# Patient Record
Sex: MALE | Race: WHITE | Employment: OTHER | URBAN - METROPOLITAN AREA
[De-identification: names, ages, dates, MRNs, and addresses within clinical notes are randomized per-mention and may not be internally consistent; named-entity substitution may affect disease eponyms.]

---

## 2019-01-08 ENCOUNTER — TRANSFERRED RECORDS (OUTPATIENT)
Dept: HEALTH INFORMATION MANAGEMENT | Facility: CLINIC | Age: 79
End: 2019-01-08

## 2019-01-10 ENCOUNTER — TRANSFERRED RECORDS (OUTPATIENT)
Dept: HEALTH INFORMATION MANAGEMENT | Facility: CLINIC | Age: 79
End: 2019-01-10

## 2019-01-10 LAB
CREAT SERPL-MCNC: 1.7 MG/DL (ref 0.7–1.2)
GFR SERPL CREATININE-BSD FRML MDRD: 39 ML/MIN/1.73M2
GLUCOSE SERPL-MCNC: 87 MG/DL (ref 74–106)
POTASSIUM SERPL-SCNC: 5.4 MMOL/L (ref 3.5–5)

## 2019-01-24 ENCOUNTER — TRANSFERRED RECORDS (OUTPATIENT)
Dept: HEALTH INFORMATION MANAGEMENT | Facility: CLINIC | Age: 79
End: 2019-01-24

## 2019-01-24 ENCOUNTER — APPOINTMENT (OUTPATIENT)
Dept: CT IMAGING | Facility: CLINIC | Age: 79
End: 2019-01-24
Payer: COMMERCIAL

## 2019-01-24 ENCOUNTER — HOSPITAL ENCOUNTER (OUTPATIENT)
Facility: CLINIC | Age: 79
Setting detail: OBSERVATION
Discharge: HOME | End: 2019-01-25
Attending: EMERGENCY MEDICINE | Admitting: HOSPITALIST
Payer: COMMERCIAL

## 2019-01-24 DIAGNOSIS — N17.9 AKI (ACUTE KIDNEY INJURY) (H): ICD-10-CM

## 2019-01-24 DIAGNOSIS — R55 SYNCOPE, UNSPECIFIED SYNCOPE TYPE: ICD-10-CM

## 2019-01-24 LAB
ALBUMIN UR-MCNC: NEGATIVE MG/DL
ANION GAP SERPL CALCULATED.3IONS-SCNC: 9 MMOL/L (ref 3–14)
APPEARANCE UR: CLEAR
BASOPHILS # BLD AUTO: 0 10E9/L (ref 0–0.2)
BASOPHILS NFR BLD AUTO: 0.1 %
BILIRUB UR QL STRIP: NEGATIVE
BUN SERPL-MCNC: 40 MG/DL (ref 7–30)
CALCIUM SERPL-MCNC: 8.1 MG/DL (ref 8.5–10.1)
CHLORIDE SERPL-SCNC: 110 MMOL/L (ref 94–109)
CO2 SERPL-SCNC: 24 MMOL/L (ref 20–32)
COLOR UR AUTO: NORMAL
CREAT SERPL-MCNC: 1.81 MG/DL (ref 0.66–1.25)
DIFFERENTIAL METHOD BLD: ABNORMAL
EOSINOPHIL # BLD AUTO: 0.1 10E9/L (ref 0–0.7)
EOSINOPHIL NFR BLD AUTO: 1.2 %
ERYTHROCYTE [DISTWIDTH] IN BLOOD BY AUTOMATED COUNT: 14.1 % (ref 10–15)
GFR SERPL CREATININE-BSD FRML MDRD: 35 ML/MIN/{1.73_M2}
GLUCOSE SERPL-MCNC: 102 MG/DL (ref 70–99)
GLUCOSE UR STRIP-MCNC: NEGATIVE MG/DL
HCT VFR BLD AUTO: 31.2 % (ref 40–53)
HGB BLD-MCNC: 10.2 G/DL (ref 13.3–17.7)
HGB UR QL STRIP: NEGATIVE
IMM GRANULOCYTES # BLD: 0 10E9/L (ref 0–0.4)
IMM GRANULOCYTES NFR BLD: 0.4 %
INTERPRETATION ECG - MUSE: NORMAL
KETONES UR STRIP-MCNC: NEGATIVE MG/DL
LEUKOCYTE ESTERASE UR QL STRIP: NEGATIVE
LYMPHOCYTES # BLD AUTO: 1.1 10E9/L (ref 0.8–5.3)
LYMPHOCYTES NFR BLD AUTO: 16 %
MCH RBC QN AUTO: 30.4 PG (ref 26.5–33)
MCHC RBC AUTO-ENTMCNC: 32.7 G/DL (ref 31.5–36.5)
MCV RBC AUTO: 93 FL (ref 78–100)
MONOCYTES # BLD AUTO: 0.6 10E9/L (ref 0–1.3)
MONOCYTES NFR BLD AUTO: 8.2 %
NEUTROPHILS # BLD AUTO: 5 10E9/L (ref 1.6–8.3)
NEUTROPHILS NFR BLD AUTO: 74.1 %
NITRATE UR QL: NEGATIVE
NRBC # BLD AUTO: 0 10*3/UL
NRBC BLD AUTO-RTO: 0 /100
PH UR STRIP: 6 PH (ref 5–7)
PLATELET # BLD AUTO: 139 10E9/L (ref 150–450)
POTASSIUM SERPL-SCNC: 4.1 MMOL/L (ref 3.4–5.3)
RBC # BLD AUTO: 3.36 10E12/L (ref 4.4–5.9)
RBC #/AREA URNS AUTO: <1 /HPF (ref 0–2)
SODIUM SERPL-SCNC: 143 MMOL/L (ref 133–144)
SOURCE: NORMAL
SP GR UR STRIP: 1 (ref 1–1.03)
SQUAMOUS #/AREA URNS AUTO: <1 /HPF (ref 0–1)
TROPONIN I SERPL-MCNC: <0.015 UG/L (ref 0–0.04)
TSH SERPL DL<=0.005 MIU/L-ACNC: 1.39 MU/L (ref 0.4–4)
UROBILINOGEN UR STRIP-MCNC: NORMAL MG/DL (ref 0–2)
WBC # BLD AUTO: 6.8 10E9/L (ref 4–11)
WBC #/AREA URNS AUTO: <1 /HPF (ref 0–5)

## 2019-01-24 PROCEDURE — 99285 EMERGENCY DEPT VISIT HI MDM: CPT | Mod: 25

## 2019-01-24 PROCEDURE — 93005 ELECTROCARDIOGRAM TRACING: CPT

## 2019-01-24 PROCEDURE — 84484 ASSAY OF TROPONIN QUANT: CPT | Performed by: EMERGENCY MEDICINE

## 2019-01-24 PROCEDURE — 80048 BASIC METABOLIC PNL TOTAL CA: CPT | Performed by: EMERGENCY MEDICINE

## 2019-01-24 PROCEDURE — 96360 HYDRATION IV INFUSION INIT: CPT

## 2019-01-24 PROCEDURE — 25000128 H RX IP 250 OP 636: Performed by: EMERGENCY MEDICINE

## 2019-01-24 PROCEDURE — A9270 NON-COVERED ITEM OR SERVICE: HCPCS | Mod: GY | Performed by: HOSPITALIST

## 2019-01-24 PROCEDURE — 84484 ASSAY OF TROPONIN QUANT: CPT | Performed by: HOSPITALIST

## 2019-01-24 PROCEDURE — 25000132 ZZH RX MED GY IP 250 OP 250 PS 637: Mod: GY | Performed by: HOSPITALIST

## 2019-01-24 PROCEDURE — 25000128 H RX IP 250 OP 636: Performed by: HOSPITALIST

## 2019-01-24 PROCEDURE — 99220 ZZC INITIAL OBSERVATION CARE,LEVL III: CPT | Performed by: HOSPITALIST

## 2019-01-24 PROCEDURE — 85025 COMPLETE CBC W/AUTO DIFF WBC: CPT | Performed by: EMERGENCY MEDICINE

## 2019-01-24 PROCEDURE — 96361 HYDRATE IV INFUSION ADD-ON: CPT

## 2019-01-24 PROCEDURE — G0378 HOSPITAL OBSERVATION PER HR: HCPCS

## 2019-01-24 PROCEDURE — 70450 CT HEAD/BRAIN W/O DYE: CPT

## 2019-01-24 PROCEDURE — 36415 COLL VENOUS BLD VENIPUNCTURE: CPT | Performed by: HOSPITALIST

## 2019-01-24 PROCEDURE — 81001 URINALYSIS AUTO W/SCOPE: CPT | Performed by: EMERGENCY MEDICINE

## 2019-01-24 PROCEDURE — 84443 ASSAY THYROID STIM HORMONE: CPT | Performed by: EMERGENCY MEDICINE

## 2019-01-24 RX ORDER — POLYETHYLENE GLYCOL 3350 17 G/17G
17 POWDER, FOR SOLUTION ORAL 2 TIMES DAILY
Status: DISCONTINUED | OUTPATIENT
Start: 2019-01-24 | End: 2019-01-25 | Stop reason: HOSPADM

## 2019-01-24 RX ORDER — AMMONIUM LACTATE 12 G/100G
LOTION TOPICAL DAILY
COMMUNITY

## 2019-01-24 RX ORDER — VENLAFAXINE HYDROCHLORIDE 150 MG/1
187.5 CAPSULE, EXTENDED RELEASE ORAL DAILY
COMMUNITY

## 2019-01-24 RX ORDER — BISACODYL 5 MG/1
10 TABLET, DELAYED RELEASE ORAL DAILY PRN
COMMUNITY

## 2019-01-24 RX ORDER — POLYETHYLENE GLYCOL 3350 17 G/17G
1 POWDER, FOR SOLUTION ORAL 2 TIMES DAILY
COMMUNITY

## 2019-01-24 RX ORDER — CARBIDOPA, LEVODOPA AND ENTACAPONE 37.5; 200; 15 MG/1; MG/1; MG/1
1 TABLET, FILM COATED ORAL 3 TIMES DAILY
Status: DISCONTINUED | OUTPATIENT
Start: 2019-01-24 | End: 2019-01-24

## 2019-01-24 RX ORDER — FLUTICASONE PROPIONATE 50 MCG
1 SPRAY, SUSPENSION (ML) NASAL DAILY
COMMUNITY

## 2019-01-24 RX ORDER — ONDANSETRON 4 MG/1
4 TABLET, ORALLY DISINTEGRATING ORAL EVERY 6 HOURS PRN
Status: DISCONTINUED | OUTPATIENT
Start: 2019-01-24 | End: 2019-01-25 | Stop reason: HOSPADM

## 2019-01-24 RX ORDER — GABAPENTIN 300 MG/1
300 CAPSULE ORAL 3 TIMES DAILY
COMMUNITY

## 2019-01-24 RX ORDER — FLUDROCORTISONE ACETATE 0.1 MG/1
0.1 TABLET ORAL DAILY
Status: DISCONTINUED | OUTPATIENT
Start: 2019-01-25 | End: 2019-01-24

## 2019-01-24 RX ORDER — CARBIDOPA/LEVODOPA 25MG-250MG
1 TABLET ORAL 3 TIMES DAILY
Status: DISCONTINUED | OUTPATIENT
Start: 2019-01-24 | End: 2019-01-25 | Stop reason: HOSPADM

## 2019-01-24 RX ORDER — QUETIAPINE FUMARATE 50 MG/1
50 TABLET, FILM COATED ORAL AT BEDTIME
COMMUNITY

## 2019-01-24 RX ORDER — CARBIDOPA/LEVODOPA 25MG-250MG
1 TABLET ORAL 3 TIMES DAILY
COMMUNITY

## 2019-01-24 RX ORDER — SODIUM CHLORIDE 9 MG/ML
INJECTION, SOLUTION INTRAVENOUS CONTINUOUS
Status: DISCONTINUED | OUTPATIENT
Start: 2019-01-24 | End: 2019-01-25

## 2019-01-24 RX ORDER — GABAPENTIN 300 MG/1
300 CAPSULE ORAL 2 TIMES DAILY
Status: ON HOLD | COMMUNITY
End: 2019-01-24

## 2019-01-24 RX ORDER — CLONAZEPAM 0.5 MG/1
1 TABLET ORAL AT BEDTIME
Status: DISCONTINUED | OUTPATIENT
Start: 2019-01-24 | End: 2019-01-24

## 2019-01-24 RX ORDER — ONDANSETRON 2 MG/ML
4 INJECTION INTRAMUSCULAR; INTRAVENOUS EVERY 6 HOURS PRN
Status: DISCONTINUED | OUTPATIENT
Start: 2019-01-24 | End: 2019-01-25 | Stop reason: HOSPADM

## 2019-01-24 RX ORDER — AMOXICILLIN 250 MG
3 CAPSULE ORAL 2 TIMES DAILY PRN
COMMUNITY

## 2019-01-24 RX ORDER — SODIUM CHLORIDE 9 MG/ML
1000 INJECTION, SOLUTION INTRAVENOUS CONTINUOUS
Status: DISCONTINUED | OUTPATIENT
Start: 2019-01-24 | End: 2019-01-24

## 2019-01-24 RX ORDER — MIRTAZAPINE 15 MG/1
45 TABLET, FILM COATED ORAL AT BEDTIME
Status: DISCONTINUED | OUTPATIENT
Start: 2019-01-24 | End: 2019-01-25 | Stop reason: HOSPADM

## 2019-01-24 RX ORDER — MIRTAZAPINE 45 MG/1
45 TABLET, FILM COATED ORAL AT BEDTIME
COMMUNITY

## 2019-01-24 RX ORDER — ACETAMINOPHEN 650 MG/1
650 SUPPOSITORY RECTAL EVERY 4 HOURS PRN
Status: DISCONTINUED | OUTPATIENT
Start: 2019-01-24 | End: 2019-01-25 | Stop reason: HOSPADM

## 2019-01-24 RX ORDER — ACETAMINOPHEN 325 MG/1
650 TABLET ORAL EVERY 4 HOURS PRN
Status: DISCONTINUED | OUTPATIENT
Start: 2019-01-24 | End: 2019-01-25 | Stop reason: HOSPADM

## 2019-01-24 RX ORDER — CARBIDOPA AND LEVODOPA 25; 100 MG/1; MG/1
1 TABLET ORAL 3 TIMES DAILY
COMMUNITY

## 2019-01-24 RX ORDER — CARBIDOPA AND LEVODOPA 25; 100 MG/1; MG/1
2 TABLET ORAL 3 TIMES DAILY
Status: DISCONTINUED | OUTPATIENT
Start: 2019-01-24 | End: 2019-01-24

## 2019-01-24 RX ORDER — RASAGILINE 1 MG/1
1 TABLET ORAL EVERY MORNING
Status: DISCONTINUED | OUTPATIENT
Start: 2019-01-25 | End: 2019-01-24

## 2019-01-24 RX ORDER — NALOXONE HYDROCHLORIDE 0.4 MG/ML
.1-.4 INJECTION, SOLUTION INTRAMUSCULAR; INTRAVENOUS; SUBCUTANEOUS
Status: DISCONTINUED | OUTPATIENT
Start: 2019-01-24 | End: 2019-01-25 | Stop reason: HOSPADM

## 2019-01-24 RX ORDER — GABAPENTIN 300 MG/1
300 CAPSULE ORAL 3 TIMES DAILY
Status: DISCONTINUED | OUTPATIENT
Start: 2019-01-24 | End: 2019-01-25 | Stop reason: HOSPADM

## 2019-01-24 RX ORDER — NITROGLYCERIN 0.4 MG/1
0.4 TABLET SUBLINGUAL EVERY 5 MIN PRN
Status: DISCONTINUED | OUTPATIENT
Start: 2019-01-24 | End: 2019-01-25 | Stop reason: HOSPADM

## 2019-01-24 RX ORDER — QUETIAPINE FUMARATE 25 MG/1
50 TABLET, FILM COATED ORAL AT BEDTIME
Status: DISCONTINUED | OUTPATIENT
Start: 2019-01-24 | End: 2019-01-25 | Stop reason: HOSPADM

## 2019-01-24 RX ORDER — PANTOPRAZOLE SODIUM 40 MG/1
40 TABLET, DELAYED RELEASE ORAL DAILY
COMMUNITY

## 2019-01-24 RX ORDER — CARBIDOPA AND LEVODOPA 25; 100 MG/1; MG/1
1 TABLET ORAL 3 TIMES DAILY
Status: DISCONTINUED | OUTPATIENT
Start: 2019-01-24 | End: 2019-01-25 | Stop reason: HOSPADM

## 2019-01-24 RX ORDER — LIDOCAINE 40 MG/G
CREAM TOPICAL
Status: DISCONTINUED | OUTPATIENT
Start: 2019-01-24 | End: 2019-01-25 | Stop reason: HOSPADM

## 2019-01-24 RX ORDER — OXYCODONE AND ACETAMINOPHEN 5; 325 MG/1; MG/1
1 TABLET ORAL EVERY 6 HOURS PRN
Status: DISCONTINUED | OUTPATIENT
Start: 2019-01-24 | End: 2019-01-25 | Stop reason: HOSPADM

## 2019-01-24 RX ADMIN — SODIUM CHLORIDE 100 ML/HR: 9 INJECTION, SOLUTION INTRAVENOUS at 18:05

## 2019-01-24 RX ADMIN — QUETIAPINE 50 MG: 25 TABLET ORAL at 21:23

## 2019-01-24 RX ADMIN — CARBIDOPA AND LEVODOPA 1 TABLET: 25; 250 TABLET ORAL at 21:22

## 2019-01-24 RX ADMIN — SODIUM CHLORIDE 1000 ML: 9 INJECTION, SOLUTION INTRAVENOUS at 14:05

## 2019-01-24 RX ADMIN — MIRTAZAPINE 45 MG: 15 TABLET, FILM COATED ORAL at 21:22

## 2019-01-24 RX ADMIN — GABAPENTIN 300 MG: 300 CAPSULE ORAL at 20:59

## 2019-01-24 RX ADMIN — CARBIDOPA AND LEVODOPA 1 TABLET: 25; 100 TABLET ORAL at 19:25

## 2019-01-24 RX ADMIN — POLYETHYLENE GLYCOL 3350 17 G: 17 POWDER, FOR SOLUTION ORAL at 20:59

## 2019-01-24 ASSESSMENT — ENCOUNTER SYMPTOMS
DIARRHEA: 0
FEVER: 0
LIGHT-HEADEDNESS: 1
WEAKNESS: 1
SHORTNESS OF BREATH: 0
HEMATURIA: 0
NAUSEA: 0
NUMBNESS: 0
VOMITING: 0

## 2019-01-24 ASSESSMENT — MIFFLIN-ST. JEOR: SCORE: 1680.62

## 2019-01-24 NOTE — H&P
[facility]    History and Physical - Hospitalist Service       Date of Admission:  1/24/2019    Assessment & Plan   Sam Dye is a 78 year old male admitted on 1/24/2019.  Parkinson's disease with orthostatic hypotension, CAD status post CABG, chronic anemia, KATY, anxiety, ADHD who presents with syncope.    Syncope  Most likely secondary to chronic orthostatic hypotension, with possible component hypovolemia given elevated BUN and creatinine.  Available records from 2016 show baseline Cr 1.1-1.2 with admission creatinine 1.81.  Denies cardiorespiratory symptoms or infectious symptoms.  No reported sign of seizure activity or postictal state.   - infuse 1L NS over 10 hours   - given chronic orthostatic hypotension, would NOT repeatedly bolus if orthostatics remain positive  - telemetry, trend trop x2 but very low suspicion for ACS  - hgb down slightly from labs in 2016, will repeat labs in AM  - TSH  - CT head is pending given of SAH, though seems this was traumatic in nature  - [facility]    ROSE on CKD  Baseline creatinine as noted above.  Attempt to obtain outside records for more recent values.  Manage IV fluids as above and repeat BMP in the morning.    Parkinson's disease with orthostatic hypotension  Prior neurology and cardiology notes state orthostatic hypotension is suspected secondary to Parkinson's.  Has had negative EP study in 2008, negative loop recorder, negative stress tests in 2013.  Would not pursue further workup.  -Resume prior to admission Sinemet, Azilect, rotigotine, fludrocortisone once verified    Anxiety  -Continue prior to admission Klonopin, fluoxetine once verified    Hyperlipidemia  -Hold simvastatin as observation status    BPH  -Hold Flomax and trospium as observation status         Diet: regular  DVT Prophylaxis: Low Risk/no VTE prophylaxis indicated  Dumont Catheter: not present  Code Status: Full Code    Disposition Plan  "  Expected discharge: Tomorrow, recommended to prior living arrangement once ROSE resolved, no further syncope.  Entered: Octavio Bynum MD 01/24/2019, 3:53 PM     The patient's care was discussed with the Patient.    Octavio Bynum MD  [facility]    ______________________________________________________________________    Chief Complaint   Syncope    History is obtained from the patient, chart review and discussion with ED physician    History of Present Illness   Sam Dye is a 78 year old male who presents with syncope.  Patient was at his adult  center, transferring from his wheelchair to a recliner when he reports he became very weak and passed out.  He states he \"grayed out\" with gradual vision loss and was feeling lightheaded.  He has a long history of orthostatic hypotension, though reports his symptoms seem to be becoming more severe.  He denies any preceding chest pain or pressure, palpitations and denies any infectious symptoms including fevers or chills, shortness of breath, cough, headache, sore throat, nausea, diarrhea, rash or sores, urinary symptoms.  He denies any confusion following his syncopal episode.  His remainder of review of systems is otherwise negative.  Reports over the past 2 years he has [facility] both neurolog[facility] which is why we do not have records since 2016.  He is currently feeling back to baseline and asymptomatic.  He reports good oral intake over the past several days.    Review of Systems    The 10 point Review of Systems is negative other than noted in the HPI or here.     Past Medical History    Chronic kidney disease  Obstructive sleep apnea  Parkinson's disease with orthostatic hypotension  CAD status post CABG x1  Anxiety  ADHD  Basal cell carcinoma  Chronic anemia  History of traumatic subarachnoid hemorrhage  History of syncope    Past Surgical History   I have reviewed this patient's surgical " history and updated it with pertinent information if needed.  Past Surgical History:   Procedure Laterality Date     APPENDECTOMY       C LAMINECTOMY,>2 SGMT,CERVICAL       C NONSPECIFIC PROCEDURE      rotator cuff     C NONSPECIFIC PROCEDURE      scrotal fistula     C NONSPECIFIC PROCEDURE       CIRCUMCISION,CLAMP  age 22     COLONOSCOPY       CORONARY ARTERY BYPASS       HEMORRHOIDECTOMY       TONSILLECTOMY       VASECTOMY         Social History   He reports a distant tobacco history having quit in .  He has about 1 alcoholic beverage every other month.  He is largely wheelchair bound and resides with his wife at home.  He has home health coming in Monday, Wednesday and Friday.    Family History   Father with a CVA late in life.    Prior to Admission Medications   Prior to Admission Medications   Prescriptions Last Dose Informant Patient Reported? Taking?   ASPIRIN 81 MG OR TABS   Yes No   Si TABLET DAILY   Calcium Carbonate-Vitamin D (CALCIUM 600 + D OR)   Yes No   Sig: Take 1 tablet by mouth 2 times daily (with meals)   Cholecalciferol (VITAMIN D3) 2000 UNITS TABS   Yes No   Sig: Take 1 tablet by mouth daily   FISH OIL 1000 MG OR CAPS   Yes No   Sig: Take 2 capsules by mouth daily.    FLUoxetine (PROZAC) 20 MG capsule   No No   Sig: Take 1 capsule (20 mg) by mouth daily   MELATONIN PO   Yes No   Sig: Take by mouth daily At hs   Rotigotine 8 MG/24HR PT24   Yes No   Sig: Daily plus the 2mg patch for total daily dose of 10mg per day..    carbidopa-levodopa (SINEMET)  MG per tablet   Yes No   Sig: Take 2 tablets by mouth 3 times daily With Stalevo   carbidopa-levodopa-entacapone (STALEVO 150) 37.5-150-200 MG per tablet   Yes No   Sig: Take 1 tablet by mouth 3 times daily With Sinemet   clonazePAM (KLONOPIN) 1 MG tablet   Yes No   Sig: Take 1 mg by mouth At Bedtime.    docusate sodium (COLACE) 100 MG capsule   No No   Sig: Take 1 capsule by mouth 2 times daily.   ferrous sulfate  325 (65 FE) MG tablet   Yes No   Sig: Take 2 tablets by mouth daily   fludrocortisone (FLORINEF) 0.1 MG tablet   No No   Sig: Take 1 tablet by mouth daily.   ipratropium (ATROVENT) 0.06 % nasal spray   No No   Sig: Spray 2 sprays into both nostrils 4 times daily as needed for rhinitis.   nitroglycerin (NITROSTAT) 0.4 MG SL tablet   No No   Sig: Place 1 tablet (0.4 mg) under the tongue every 5 minutes as needed up to 3 tablets per episode.   olopatadine (PATANOL) 0.1 % ophthalmic solution   No No   Sig: Instill 1 drop into each eye twice daily as needed for allergies   oxyCODONE-acetaminophen (PERCOCET) 5-325 MG per tablet   No No   Sig: Take 1 tablet by mouth every 6 hours as needed for pain.   rasagiline (AZILECT) 1 MG TABS   Yes No   Sig: Take 1 mg by mouth every morning.    sennosides (SENNA) 8.6 MG tablet   No No   Sig: Take 1 tablet by mouth 2 times daily as needed for constipation.   simvastatin (ZOCOR) 40 MG tablet   No No   Sig: Take 1 tablet (40 mg) by mouth At Bedtime   tamsulosin (FLOMAX) 0.4 MG 24 hr capsule   No No   Sig: Take 1 capsule by mouth daily.   trospium (SANCTURA XR) 60 MG CP24   No No   Sig: Take 1 capsule (60 mg) by mouth every morning      Facility-Administered Medications: None     Allergies   Allergies   Allergen Reactions     Ivp Dye [Contrast Dye]      Tape [Adhesive Tape]        Physical Exam   Vital Signs: Temp: 97.5  F (36.4  C) Temp src: Oral BP: (!) 164/98 Pulse: 82 Heart Rate: 80 Resp: 8 SpO2: 100 %      Weight: 0 lbs 0 oz    General Appearance: Well-developed, well-nourished male in no acute distress  Eyes: Pupils equal, round and reactive to light, sclera anicteric  HEENT: Mucous membranes moist, no neck lymphadenopathy  Respiratory: Lungs clear bilaterally without wheezes or crackles, no tachypnea  Cardiovascular: Regular rate and rhythm, normal S1/S2, grade 1/6 systolic murmur  GI: Abdomen soft, nontender, nondistended, normal bowel sounds  Lymph/Hematologic: No peripheral  edema  Skin: No rash or bruising  Musculoskeletal: Extremities warm and well-perfused  Neurologic: Cranial nerves II through XII intact, strength grossly intact, alert and appropriate  Psychiatric: Normal affect    Data   Data reviewed today: I reviewed all medications, new labs and imaging results over the last 24 hours. I personally reviewed the EKG tracing showing Normal sinus rhythm without ischemic changes.    Recent Labs   Lab 01/24/19  1349   WBC 6.8   HGB 10.2*   MCV 93   *      POTASSIUM 4.1   CHLORIDE 110*   CO2 24   BUN 40*   CR 1.81*   ANIONGAP 9   OVI 8.1*   *   TROPI <0.015     No results found for this or any previous visit (from the past 24 hour(s)).

## 2019-01-24 NOTE — PROGRESS NOTES
RECEIVING UNIT ED HANDOFF REVIEW    ED Nurse Handoff Report was reviewed by: Melissa Guzman on January 24, 2019 at 4:25 PM      read

## 2019-01-24 NOTE — ED PROVIDER NOTES
History     Chief Complaint:  Lightheaded    HPI   Sam Dye is a 78 year old male, with a history of HDL, CAD, Parkinson's disease, and HTN, who presents to the emergency department for evaluation of lightheadedness/unresponsive episode. The patient reports he was at his  facility when he was transferring from his wheelchair to a recliner when he got lightheaded, weak, and his vision darkened. He denies any loss of consciousness although EMS states that he had an unspecified period of unresponsiveness. He reports he is currently not feeling lightheaded or dizzy. He denies any chest pain, shortness of breath, nausea, vomiting, diarrhea, fever, numbness, or hematuria. He notes a history of orthostatic HTN that worsened today. He notes he was started on prednisone yesterday due to a recent fall and neck injury.    Allergies:  Contrast dye  Tape     Medications:    81 mg Aspirin  Sinemet  Stavelo  Clonazepam  Colace  Florinef  Prozac  Melatonin  Nitroglycerin  Percocet  Rasagiline  Rotigotine  Senna  Zocor  Flomax  Trospium    Past Medical History:    Depression  Psychosexual disorder  Allergic rhinitis  HDL  ADHD  Basal cell carcinoma  Parkinson's disease  Anxiety  Sleep apnea  CAD  HTN    Past Surgical History:    Appendectomy  Laminectomy  Rotator cuff surgery  Scrotal fistula  Circumcision  Coronary artery bypass  Hemorrhoidectomy  Tonsillectomy  Vasectomy    Family History:    Breast cancer  Alzheimer disease  Thyroid disease  CAD  Diabetes  HTN  Cerebrovascular disease  Cardiovascular  Circulatory  Depression  Alcohol/Drug    Social History:  Smoking status: Former smoker. Quit date: 1/1/1969   Alcohol use: No  The patient presents to the emergency department by himself.  Marital Status:   [2]     Review of Systems   Constitutional: Negative for fever.   Eyes: Positive for visual disturbance.   Respiratory: Negative for shortness of breath.    Cardiovascular: Negative for chest pain.    Gastrointestinal: Negative for diarrhea, nausea and vomiting.   Genitourinary: Negative for hematuria.   Neurological: Positive for weakness and light-headedness. Negative for syncope and numbness.   A 10 point ROS was obtained and negative except as noted here and in HPI      Physical Exam   Patient Vitals for the past 24 hrs:   BP Temp Temp src Pulse Heart Rate Resp SpO2   01/24/19 1500 (!) 164/98 -- -- 82 80 8 100 %   01/24/19 1430 (!) 138/94 -- -- -- 85 14 99 %   01/24/19 1424 (!) 138/94 -- -- 83 -- -- 98 %   01/24/19 1351 124/69 97.5  F (36.4  C) Oral 78 -- 16 98 %     Physical Exam  VS: Reviewed per above  HENT: Mucous membranes dry  EYES: sclera anicteric  CV: Rate as noted, regular rhythm.   RESP: Effort normal. Breath sounds are normal bilaterally.  GI: no tenderness, not distended.  NEURO: GCS 15, moving all extremities 5 out of 5 strength, sensation is intact to light touch in all 4 extremities, finger-nose-finger is intact bilaterally, no facial asymmetry, pupils are equal and reactive to light bilaterally.  MSK: No deformity of the extremities  SKIN: Warm and dry      Emergency Department Course   ECG (13:50:46):  Rate 75 bpm. IL interval 188. QRS duration 94. QT/QTc 384/428. P-R-T axes 60 38 23. Sinus rhythm with premature atrial complexes. Nonspecific T wave abnormality. Abnormal ECG. No significant change when compared to EKG dated 7/31/12. Interpreted at 1350 by Gilbert Rios MD.    Imaging:  Radiographic findings were communicated with the patient who voiced understanding of the findings.    Head CT w/o Contrast  pending    Laboratory:  UA: All Negative    CBC: HGB 10.2 (L),  (L) o/w WNL (WBC 6.8)  BMP: Chloride 110 (H), Glucose 102 (H), Urea Nitrogen 40 (H), Creatinine 1.81 (H), GFR Estimate 35 (L), Calcium 8.1 (L) o/w WNL  Troponin I (1349): <0.015  TSH: 1.39    Interventions:  1405 NS 1L IV Bolus    Emergency Department Course:  Patient arrived via EMS.    Past medical records,  nursing notes, and vitals reviewed.  1427: I performed an exam of the patient and obtained history, as documented above.    IV inserted and blood drawn.    The patient was sent for a head CT while in the emergency department, findings above.     Findings and plan explained to the Patient who consents to admission.     1522: Discussed the patient with Dr. Mejia, who will admit the patient to an observation bed for further monitoring, evaluation, and treatment.   Impression & Plan    Medical Decision Making:  Patient presents to the emergency department after syncopal event.  Initial vital signs are within normal limits.  Exam does not reveal focal neurological deficits.  By history it sounds as though there was no trauma during the unresponsive event.  I reviewed the chart and saw that he has a history of orthostatic hypotension, which is likely the etiology of his presentation today.  Lab work was notable for ROSE with a creatinine of 1.8.  He was given 1 L of normal saline due to concern for possible prerenal ROSE.  EKG did not reveal dysrhythmia or signs of ischemia and a single.  Patient does have history of ACS although this seems less likely by history.  UA did not reveal evidence of infectious process.  Head CT was ordered and pending upon admission.  Given patient's unresponsive episode in the setting of ROSE with cardiac history, patient was admitted to observation for further monitoring and rehydration.    Diagnosis:    ICD-10-CM   1. Syncope, unspecified syncope type R55   2. ROSE (acute kidney injury) (H) N17.9     Disposition:  Admitted to observation.  Arthur Fernandez  1/24/2019    EMERGENCY DEPARTMENT  Scribe Disclosure:  I, Arthur Fernandez, am serving as a scribe at 2:27 PM on 1/24/2019 to document services personally performed by Gilbert Rios MD based on my observations and the provider's statements to me.      Gilbert Rios MD  01/24/19 1921

## 2019-01-25 VITALS
BODY MASS INDEX: 28.97 KG/M2 | HEART RATE: 82 BPM | TEMPERATURE: 98.1 F | WEIGHT: 206.9 LBS | SYSTOLIC BLOOD PRESSURE: 122 MMHG | HEIGHT: 71 IN | RESPIRATION RATE: 16 BRPM | OXYGEN SATURATION: 97 % | DIASTOLIC BLOOD PRESSURE: 51 MMHG

## 2019-01-25 LAB
ANION GAP SERPL CALCULATED.3IONS-SCNC: 8 MMOL/L (ref 3–14)
BUN SERPL-MCNC: 38 MG/DL (ref 7–30)
CALCIUM SERPL-MCNC: 8.3 MG/DL (ref 8.5–10.1)
CHLORIDE SERPL-SCNC: 114 MMOL/L (ref 94–109)
CO2 SERPL-SCNC: 23 MMOL/L (ref 20–32)
CREAT SERPL-MCNC: 1.6 MG/DL (ref 0.66–1.25)
GFR SERPL CREATININE-BSD FRML MDRD: 41 ML/MIN/{1.73_M2}
GLUCOSE SERPL-MCNC: 112 MG/DL (ref 70–99)
HGB BLD-MCNC: 10.2 G/DL (ref 13.3–17.7)
POTASSIUM SERPL-SCNC: 5.2 MMOL/L (ref 3.4–5.3)
SODIUM SERPL-SCNC: 145 MMOL/L (ref 133–144)

## 2019-01-25 PROCEDURE — 25000132 ZZH RX MED GY IP 250 OP 250 PS 637: Performed by: HOSPITALIST

## 2019-01-25 PROCEDURE — 96361 HYDRATE IV INFUSION ADD-ON: CPT

## 2019-01-25 PROCEDURE — G0378 HOSPITAL OBSERVATION PER HR: HCPCS

## 2019-01-25 PROCEDURE — 36415 COLL VENOUS BLD VENIPUNCTURE: CPT | Performed by: HOSPITALIST

## 2019-01-25 PROCEDURE — 85018 HEMOGLOBIN: CPT | Performed by: HOSPITALIST

## 2019-01-25 PROCEDURE — 99217 ZZC OBSERVATION CARE DISCHARGE: CPT | Performed by: PHYSICIAN ASSISTANT

## 2019-01-25 PROCEDURE — 80048 BASIC METABOLIC PNL TOTAL CA: CPT | Performed by: HOSPITALIST

## 2019-01-25 RX ADMIN — POLYETHYLENE GLYCOL 3350 17 G: 17 POWDER, FOR SOLUTION ORAL at 08:17

## 2019-01-25 RX ADMIN — VENLAFAXINE HYDROCHLORIDE 187.5 MG: 150 CAPSULE, EXTENDED RELEASE ORAL at 08:17

## 2019-01-25 RX ADMIN — CARBIDOPA AND LEVODOPA 1 TABLET: 25; 250 TABLET ORAL at 08:17

## 2019-01-25 RX ADMIN — GABAPENTIN 300 MG: 300 CAPSULE ORAL at 08:17

## 2019-01-25 RX ADMIN — CARBIDOPA AND LEVODOPA 1 TABLET: 25; 100 TABLET ORAL at 08:17

## 2019-01-25 NOTE — CONSULTS
Care Transition Initial Assessment - SW     Met with: Patient    Active Problems:    Syncope       DATA  Lives With: spouse   Quality of Family Relationships: involved  Description of Support System: Involved  Who is your support system?: Wife, Sibling(s)  Support Assessment: Adequate family and caregiver support.   Identified issues/concerns regarding health management: Pt's goal is discharge home. He attends adult day care on Tues/Thurs at a location in [place]. He has an aide come from [facility] Mon/Wed/Fri to give him a shower at home. H reports these [facility] by the [facility] and he works with a [facility] . He and his wife also have weekly housekeeping. Pt reports he ambulates and transfers independently at home. He does not anticipate needing any additional care at this time. SW suggested he speak with his VA  if he changes his mind once he returns home.      Quality of Family Relationships: involved     ASSESSMENT  Cognitive Status: Appears alert and oriented.   Concerns to be addressed: May be below baseline for mobility. May benefit from home PT.     PLAN  Financial costs for the patient includes: None.  Patient given options and choices for discharge: Yes.  Patient/family is agreeable to the plan? YES  Patient Goals and Preferences: discharge home.  Patient anticipates discharging to: home.    VITALIY Skelton, SW  [phone]

## 2019-01-25 NOTE — PLAN OF CARE
Alert and oriented, down for ct, results negative, two to stand with diff.  Condom cath in place for night per pt request.

## 2019-01-25 NOTE — PLAN OF CARE
Observation goals PRIOR TO DISCHARGE     Comments: List all  goals to be met before discharge:   - Diagnostic tests and consults completed and resulted -  Met    - No further episodes of syncope and any new arrhythmia addressed with controlled heart rates -Met    - Vital signs normal or at patient baseline and orthostatic vitals are normal and patient not lightheaded with standing- Partially met, A+1 w/ transfers     - Tolerating oral intake to maintain hydration -Met    - Safe disposition plan has been identified -Partially Met    - Nurse to notify provider when observation goals have been met and patient is ready for discharge.

## 2019-01-25 NOTE — DISCHARGE SUMMARY
"[facility]    Discharge Summary  Hospitalist    Date of Admission:  1/24/2019  Date of Discharge:  1/25/2019  Discharging Provider: German Riddle  Date of Service (when I saw the patient): 01/25/19    Discharge Diagnoses   1. Syncope  2. Generalized weakness  3. Acute kidney injury on chronic kidney disease  4. Anemia  5. Parkinson's disease  6. Chronic orthostatic hypotension  7. Generalized anxiety  8. Hyperlipidemia  9. Benign prostatic hypertrophy      History of Present Illness   Sam Dye is a 78 year old male who presents with syncope.  Patient was at his adult  center, transferring from his wheelchair to a recliner when he reports he became very weak and passed out.  He states he \"grayed out\" with gradual vision loss and was feeling lightheaded.  He has a long history of orthostatic hypotension, though reports his symptoms seem to be becoming more severe.  He denies any preceding chest pain or pressure, palpitations and denies any infectious symptoms including fevers or chills, shortness of breath, cough, headache, sore throat, nausea, diarrhea, rash or sores, urinary symptoms.  He denies any confusion following his syncopal episode.  His remainder of review of systems is otherwise negative.  Reports over the past 2 years he has [facility] which [facility] we do not have [facility] since 2016.  He is currently feeling back to baseline and asymptomatic.  He reports good oral intake over the past several days.      Hospital Course   Syncope  Most likely secondary to chronic orthostatic hypotension, with possible component hypovolemia given elevated BUN and creatinine.  Available records from 2016 show baseline Cr 1.1-1.2 with admission creatinine 1.81.  Denies cardiorespiratory symptoms or infectious symptoms.  No reported sign of seizure activity or postictal state.   - Patient was hydrated with 1 L normal saline IV " fluids  - Given chronic orthostatic hypotension, he was not repeatedly bolused even for positive orthostatics.  - telemetry sinus rhythm  - Serial troponin negative x3  - hgb down slightly from labs in 2016, repeat level this morning is the same at 10.2  - TSH nl 1.39  - CT head ordered given hx of SAH, negative for any evidence of acute intracranial abnormality.   - Patient has been consistently assist of 1-2 when getting OOB.  Per patient and wife, this is his baseline, however there is concern that he had increased needs from baseline.  - Patient declined PT consult or increasing PT/home care services at this time. Patient was discharged home at the request of his family.       ROSE on CKD  Baseline creatinine as noted above.    -[facility] to be obtained but were not available during this brief hospitalization.  -Patient was hydrated with IV fluids overnight.  -Creatinine 1.8--> 1.6.  Electrolytes stable.  -P.o. fluids encouraged  -Recommend close follow-up with PCP within 7 days for repeat BMP.     Parkinson's disease with orthostatic hypotension  Prior neurology and cardiology notes state orthostatic hypotension is suspected secondary to Parkinson's.  Has had negative EP study in 2008, negative loop recorder, negative stress tests in 2013.  Would not pursue further workup.  -Resume prior to admission Sinemet  -No longer appears to be on fludrocortisone  -Again, patient declined a PT consult or increasing PT/home care services at this time.     Anxiety  -Continue prior to admission Seroquel, Effexor-XR, and Remeron     Hyperlipidemia  -Resume simvastatin     BPH  -Resume Flomax      German Riddle PA-C    I personally saw the patient on day of discharge to evaluate him, and to discuss plan of care.    Significant Results and Procedures   CT head negative for any acute intracranial pathology.  Laboratory results as detailed below.    Pending Results   None    Code Status   Full  Code       Primary Care Physician   Garett Goodwin    Discharge Disposition   Discharged to home  Condition at discharge: Stable    Consultations This Hospital Stay   SOCIAL WORK IP CONSULT  PHYSICAL THERAPY ADULT IP CONSULT       Discharge Orders      Reason for your hospital stay    Syncopal episode most likely due orthostatic hypotension and dehydration.     Follow-up and recommended labs and tests     Follow up with primary care provider, Garett Goodwin, within 7 days for hospital follow- up.  The following labs/tests are recommended: basic metabolic panel.     Activity    Your activity upon discharge: activity as tolerated with assistance     Full Code     Diet    Follow this diet upon discharge: Regular diet.  Drink plenty of fluids.     Discharge Medications   Discharge Medication List as of 1/25/2019 12:53 PM      CONTINUE these medications which have NOT CHANGED    Details   ammonium lactate (LAC-HYDRIN) 12 % external lotion Apply topically dailyHistorical      ASPIRIN 81 MG OR TABS take 81mg by mouth daily, Historical      bisacodyl (DULCOLAX) 5 MG EC tablet Take 10 mg by mouth daily as needed for constipation, Historical      Calcium Carbonate-Vitamin D (CALCIUM 600 + D OR) Take 1 tablet by mouth 2 times daily (with meals), Historical      carbidopa-levodopa (SINEMET)  MG tablet Take 1 tablet by mouth 3 times daily, Historical      carbidopa-levodopa (SINEMET)  MG tablet Take 1 tablet by mouth 3 times daily, Historical      Cholecalciferol (VITAMIN D3) 2000 UNITS TABS Take 1 tablet by mouth daily, Historical      fluticasone (FLONASE) 50 MCG/ACT nasal spray Spray 1 spray into both nostrils daily, Historical      gabapentin (NEURONTIN) 300 MG capsule Take 300 mg by mouth 3 times daily, Historical      MELATONIN PO Take 3 mg by mouth nightly as needed At hs, Historical      mirtazapine (REMERON) 45 MG tablet Take 45 mg by mouth At Bedtime, Historical      nitroglycerin (NITROSTAT) 0.4 MG SL  tablet Place 1 tablet (0.4 mg) under the tongue every 5 minutes as needed up to 3 tablets per episode., Disp-30 tablet, R-3, E-Prescribe      pantoprazole (PROTONIX) 40 MG EC tablet Take 40 mg by mouth daily, Historical      polyethylene glycol (MIRALAX/GLYCOLAX) packet Take 1 packet by mouth 2 times daily, Historical      QUEtiapine (SEROQUEL) 50 MG tablet Take 50 mg by mouth At Bedtime, Historical      !! senna-docusate (SENOKOT-S/PERICOLACE) 8.6-50 MG tablet Take 3 tablets by mouth 2 times daily as needed for constipation, Historical      !! senna-docusate (SENOKOT-S/PERICOLACE) 8.6-50 MG tablet Take 3 tablets by mouth 2 times daily as needed for constipation, Historical      simvastatin (ZOCOR) 40 MG tablet Take 1 tablet (40 mg) by mouth At Bedtime, Disp-90 tablet, R-0, Fax      tamsulosin (FLOMAX) 0.4 MG 24 hr capsule Take 1 capsule by mouth daily., Disp-90 capsule, R-3, Fax      venlafaxine (EFFEXOR-XR) 150 MG 24 hr capsule Take 187.5 mg by mouth daily Total dose 150mg + 37.5 mg, Historical       !! - Potential duplicate medications found. Please discuss with provider.      STOP taking these medications       oxyCODONE-acetaminophen (PERCOCET) 5-325 MG per tablet Comments:   Reason for Stopping:             Allergies   Allergies   Allergen Reactions     Ivp Dye [Contrast Dye]      Tape [Adhesive Tape]      Data   Most Recent 3 CBC's:  Recent Labs   Lab Test 01/25/19  0553 01/24/19  1349 12/20/13  1000 11/08/13  0744   WBC  --  6.8 4.9 4.0   HGB 10.2* 10.2* 13.3 13.4   MCV  --  93 85 86   PLT  --  139* 192 186      Most Recent 3 BMP's:  Recent Labs   Lab Test 01/25/19  0740 01/24/19  1349 11/08/13  0744   * 143 142   POTASSIUM 5.2 4.1 4.0   CHLORIDE 114* 110* 106   CO2 23 24 27   BUN 38* 40* 24   CR 1.60* 1.81* 1.02   ANIONGAP 8 9 9   OVI 8.3* 8.1* 8.8   * 102* 86     Most Recent 2 LFT's:  Recent Labs   Lab Test 12/20/13  1000 11/08/13  0744 11/29/12  1059   AST  --  26 20   ALT 18 22 24   ALKPHOS   --  89 98   BILITOTAL  --  0.4 0.3     Most Recent 3 Troponin's:  Recent Labs   Lab Test 01/24/19  2152 01/24/19  1753 01/24/19  1349   TROPI <0.015 <0.015 <0.015     Most Recent Cholesterol Panel:  Recent Labs   Lab Test 12/20/13  1000   CHOL 120   LDL 56   HDL 44   TRIG 97     Most Recent 6 Bacteria Isolates From Any Culture (See EPIC Reports for Culture Details):No lab results found.  Most Recent TSH, T4 and A1c Labs:  Recent Labs   Lab Test 01/24/19  1349 11/08/13  0744   TSH 1.39 3.13   A1C  --  5.5     Results for orders placed or performed during the hospital encounter of 01/24/19   Head CT w/o contrast    Narrative    CT SCAN OF THE HEAD WITHOUT CONTRAST   1/24/2019 7:44 PM     HISTORY: Syncope, unresponsive episode.    TECHNIQUE: Axial images of the head and coronal reformations without  IV contrast material. Radiation dose for this scan was reduced using  automated exposure control, adjustment of the mA and/or kV according  to patient size, or iterative reconstruction technique.    COMPARISON: None.    FINDINGS: Moderate volume loss is present. White matter  hypoattenuation likely represents chronic small vessel ischemic  change. No evidence of acute ischemia, hemorrhage, mass, mass effect,  or hydrocephalus. The visualized calvarium, skull base, paranasal  sinuses, and extracranial soft tissues are unremarkable.      Impression    IMPRESSION: No acute intracranial abnormality.    JI ODONNELL MD

## 2019-01-25 NOTE — PLAN OF CARE
Observation goals PRIOR TO DISCHARGE     Comments: List all  goals to be met before discharge:   - Diagnostic tests and consults completed and resulted -  Not met    - No further episodes of syncope and any new arrhythmia addressed with controlled heart rates -Met    - Vital signs normal or at patient baseline and orthostatic vitals are normal and patient not lightheaded with standing- Partially met, orthostatic positive, pt baseline, symptomatic this am     - Tolerating oral intake to maintain hydration -Met    - Safe disposition plan has been identified -Not met    - Nurse to notify provider when observation goals have been met and patient is ready for discharge.

## 2019-01-25 NOTE — PROGRESS NOTES
"Writer spoke w/ pt wife about pt transferring needs, trying to assess whether pt at baseline or not. When writer asked pt if wife assists him at home, he stated \"no I do it by myself\". Pt has consistently been A+1-2 when getting OOB, writer asked pt how he was going to transfer at home since he's been needing assistance, pt later telling writer \"my wife helps me when I need help\". Writer paged German MATTHEWS and PT order placed to assess pt needs. Wife called inquiring about pt discharge, writer explained to wife pt transferring situation and needs, wife stated \"he normally transfers on his own and I help when he needs\", writer got impression pt transferring needs are new and increased from baseline needs. Pt wife very worried about getting pt to  to get w/c to be able to get home. Wife and pt both not interested in PT consult or increasing PT/home cares at home. German updated and discharge orders to be placed.  "

## 2019-01-25 NOTE — PLAN OF CARE
Oriented to room, and observation,  alert and oriented, vs with elevated blood pressure, orthostatic positive, MD notified.  Jared  pain, or chest pain.  Tele sinus rhythem. Troponin neg, awaiting further labs. Reviewed plan of care with pt.

## 2019-01-25 NOTE — PLAN OF CARE
A&O, hx Parkinson's, tremors baseline. VSS on RA, orthos positive, baseline, symptomatic this am when standing, improved during shift. Regular diet. Tele SR. Trops negative x3. SW consult completed. Baseline pt transfers independently using w/c and has wife help when needed, during shift A+1, see writer's previous note about PT consult and pt/wife not wanting consult completed before discharge. F/U BMP check o/p, Creat 1.81 now 1.6. AVS explained and given to pt at discharge, all questions answered, release of info form filled out and sent to medical records. Belongings taken w/ pt. Pt left unit via w/c by NA, wife providing ride home for pt.

## 2019-01-25 NOTE — PHARMACY-ADMISSION MEDICATION HISTORY
Admission medication history interview status for the 1/24/2019  admission is complete. See EPIC admission navigator for prior to admission medications     Medication history source reliability:Poor    Actions taken by pharmacist (provider contacted, etc): [facility]      Additional medication history information not noted on PTA med list : Per pt report, [facility] the most [facility]to[facility] of medications.  Timing of last doses is uncertain. Pt reports [facility].  Many changes have been made to the PTA med list.     Medication reconciliation/reorder completed by provider prior to medication history? Yes    Time spent in this activity: 60 min    Prior to Admission medications    Medication Sig Last Dose Taking? Auth Provider   ammonium lactate (LAC-HYDRIN) 12 % external lotion Apply topically daily  Yes Unknown, Entered By History   ASPIRIN 81 MG OR TABS take 81mg by mouth daily  Yes Reported, Patient   bisacodyl (DULCOLAX) 5 MG EC tablet Take 10 mg by mouth daily as needed for constipation  Yes Unknown, Entered By History   Calcium Carbonate-Vitamin D (CALCIUM 600 + D OR) Take 1 tablet by mouth 2 times daily (with meals)  Yes Reported, Patient   carbidopa-levodopa (SINEMET)  MG tablet Take 1 tablet by mouth 3 times daily  Yes Unknown, Entered By History   carbidopa-levodopa (SINEMET)  MG tablet Take 1 tablet by mouth 3 times daily  Yes Unknown, Entered By History   Cholecalciferol (VITAMIN D3) 2000 UNITS TABS Take 1 tablet by mouth daily  Yes Reported, Patient   fluticasone (FLONASE) 50 MCG/ACT nasal spray Spray 1 spray into both nostrils daily  Yes Unknown, Entered By History   gabapentin (NEURONTIN) 300 MG capsule Take 300 mg by mouth 3 times daily  Yes Unknown, Entered By History   mirtazapine (REMERON) 45 MG tablet Take 45 mg by mouth At Bedtime  Yes Unknown, Entered By History   nitroglycerin (NITROSTAT) 0.4 MG SL tablet Place  1 tablet (0.4 mg) under the tongue every 5 minutes as needed up to 3 tablets per episode.  at PRN Yes Garett Goodwin MD   pantoprazole (PROTONIX) 40 MG EC tablet Take 40 mg by mouth daily  Yes Unknown, Entered By History   polyethylene glycol (MIRALAX/GLYCOLAX) packet Take 1 packet by mouth 2 times daily  Yes Unknown, Entered By History   QUEtiapine (SEROQUEL) 50 MG tablet Take 50 mg by mouth At Bedtime  Yes Unknown, Entered By History   senna-docusate (SENOKOT-S/PERICOLACE) 8.6-50 MG tablet Take 3 tablets by mouth 2 times daily as needed for constipation  Yes Unknown, Entered By History   senna-docusate (SENOKOT-S/PERICOLACE) 8.6-50 MG tablet Take 3 tablets by mouth 2 times daily as needed for constipation  Yes Unknown, Entered By History   simvastatin (ZOCOR) 40 MG tablet Take 1 tablet (40 mg) by mouth At Bedtime  Yes Garett Goodwin MD   tamsulosin (FLOMAX) 0.4 MG 24 hr capsule Take 1 capsule by mouth daily.  Patient taking differently: Take 0.4 mg by mouth At Bedtime   Yes Garett Goodwin MD   venlafaxine (EFFEXOR-XR) 150 MG 24 hr capsule Take 187.5 mg by mouth daily Total dose 150mg + 37.5 mg  Yes Unknown, Entered By History   MELATONIN PO Take 3 mg by mouth nightly as needed At hs   Reported, Patient

## 2019-01-25 NOTE — PLAN OF CARE
Observation goals PRIOR TO DISCHARGE     Comments: List all  goals to be met before discharge:   - Diagnostic tests and consults completed and resulted -Not met    - No further episodes of syncope and any new arrhythmia addressed with controlled heart rates -Met    - Vital signs normal or at patient baseline and orthostatic vitals are normal and patient not lightheaded with standing-Not met, orthostatic positive     - Tolerating oral intake to maintain hydration -Met    - Safe disposition plan has been identified -Not met    - Nurse to notify provider when observation goals have been met and patient is ready for discharge.

## 2019-01-25 NOTE — PLAN OF CARE
Observation goals PRIOR TO DISCHARGE     Comments: List all  goals to be met before discharge:   - Diagnostic tests and consults completed and resulted -Not met    - No further episodes of syncope and any new arrhythmia addressed with controlled heart rates -Met    - Vital signs normal or at patient baseline and orthostatic vitals are normal and patient not lightheaded with standing-Not met, orthostatic positive     - Tolerating oral intake to maintain hydration -Met    - Safe disposition plan has been identified -Not met    - Nurse to notify provider when observation goals have been met and patient is ready for discharge.          Pt is A&O, orthostatic still positive and patient remain asymptomatic.. Up with 2 assist, wore condom catheter overnight and uses urinal at bedside during the day. Denies any pain, SOB nor syncope episode.I.V is s/l.Tele-NSR.

## 2020-02-24 ENCOUNTER — HEALTH MAINTENANCE LETTER (OUTPATIENT)
Age: 80
End: 2020-02-24

## 2020-12-13 ENCOUNTER — HEALTH MAINTENANCE LETTER (OUTPATIENT)
Age: 80
End: 2020-12-13

## 2021-01-22 ENCOUNTER — RECORDS - HEALTHEAST (OUTPATIENT)
Dept: LAB | Facility: CLINIC | Age: 81
End: 2021-01-22

## 2021-01-22 LAB
SARS-COV-2 PCR COMMENT: NORMAL
SARS-COV-2 RNA SPEC QL NAA+PROBE: NEGATIVE
SARS-COV-2 VIRUS SPECIMEN SOURCE: NORMAL

## 2021-01-29 ENCOUNTER — RECORDS - HEALTHEAST (OUTPATIENT)
Dept: LAB | Facility: CLINIC | Age: 81
End: 2021-01-29

## 2021-02-05 ENCOUNTER — RECORDS - HEALTHEAST (OUTPATIENT)
Dept: LAB | Facility: CLINIC | Age: 81
End: 2021-02-05

## 2021-02-12 ENCOUNTER — RECORDS - HEALTHEAST (OUTPATIENT)
Dept: LAB | Facility: CLINIC | Age: 81
End: 2021-02-12

## 2021-03-05 ENCOUNTER — RECORDS - HEALTHEAST (OUTPATIENT)
Dept: LAB | Facility: CLINIC | Age: 81
End: 2021-03-05

## 2021-04-15 ENCOUNTER — RECORDS - HEALTHEAST (OUTPATIENT)
Dept: LAB | Facility: CLINIC | Age: 81
End: 2021-04-15

## 2021-04-17 ENCOUNTER — HEALTH MAINTENANCE LETTER (OUTPATIENT)
Age: 81
End: 2021-04-17

## 2021-04-22 ENCOUNTER — RECORDS - HEALTHEAST (OUTPATIENT)
Dept: LAB | Facility: CLINIC | Age: 81
End: 2021-04-22

## 2021-04-27 ENCOUNTER — RECORDS - HEALTHEAST (OUTPATIENT)
Dept: LAB | Facility: CLINIC | Age: 81
End: 2021-04-27

## 2021-04-28 LAB
ANION GAP SERPL CALCULATED.3IONS-SCNC: 7 MMOL/L (ref 5–18)
BUN SERPL-MCNC: 45 MG/DL (ref 8–28)
CALCIUM SERPL-MCNC: 8.8 MG/DL (ref 8.5–10.5)
CHLORIDE BLD-SCNC: 106 MMOL/L (ref 98–107)
CO2 SERPL-SCNC: 25 MMOL/L (ref 22–31)
CREAT SERPL-MCNC: 1.69 MG/DL (ref 0.7–1.3)
GFR SERPL CREATININE-BSD FRML MDRD: 39 ML/MIN/1.73M2
GLUCOSE BLD-MCNC: 78 MG/DL (ref 70–125)
POTASSIUM BLD-SCNC: 4.4 MMOL/L (ref 3.5–5)
SODIUM SERPL-SCNC: 138 MMOL/L (ref 136–145)

## 2021-04-29 ENCOUNTER — RECORDS - HEALTHEAST (OUTPATIENT)
Dept: LAB | Facility: CLINIC | Age: 81
End: 2021-04-29

## 2021-05-06 ENCOUNTER — RECORDS - HEALTHEAST (OUTPATIENT)
Dept: LAB | Facility: CLINIC | Age: 81
End: 2021-05-06

## 2021-07-20 ENCOUNTER — LAB REQUISITION (OUTPATIENT)
Dept: LAB | Facility: CLINIC | Age: 81
End: 2021-07-20
Payer: COMMERCIAL

## 2021-07-20 DIAGNOSIS — I25.10 ATHEROSCLEROTIC HEART DISEASE OF NATIVE CORONARY ARTERY WITHOUT ANGINA PECTORIS: ICD-10-CM

## 2021-07-21 LAB
ANION GAP SERPL CALCULATED.3IONS-SCNC: 9 MMOL/L (ref 5–18)
BUN SERPL-MCNC: 49 MG/DL (ref 8–28)
CALCIUM SERPL-MCNC: 9.4 MG/DL (ref 8.5–10.5)
CHLORIDE BLD-SCNC: 102 MMOL/L (ref 98–107)
CO2 SERPL-SCNC: 24 MMOL/L (ref 22–31)
CREAT SERPL-MCNC: 1.6 MG/DL (ref 0.7–1.3)
GFR SERPL CREATININE-BSD FRML MDRD: 40 ML/MIN/1.73M2
GLUCOSE BLD-MCNC: 78 MG/DL (ref 70–125)
POTASSIUM BLD-SCNC: 4 MMOL/L (ref 3.5–5)
SODIUM SERPL-SCNC: 135 MMOL/L (ref 136–145)

## 2021-07-21 PROCEDURE — 36415 COLL VENOUS BLD VENIPUNCTURE: CPT | Mod: ORL | Performed by: NURSE PRACTITIONER

## 2021-07-21 PROCEDURE — 80048 BASIC METABOLIC PNL TOTAL CA: CPT | Mod: ORL | Performed by: NURSE PRACTITIONER

## 2021-07-21 PROCEDURE — P9603 ONE-WAY ALLOW PRORATED MILES: HCPCS | Mod: ORL | Performed by: NURSE PRACTITIONER

## 2021-08-09 ENCOUNTER — LAB REQUISITION (OUTPATIENT)
Dept: LAB | Facility: CLINIC | Age: 81
End: 2021-08-09
Payer: COMMERCIAL

## 2021-08-11 PROCEDURE — U0005 INFEC AGEN DETEC AMPLI PROBE: HCPCS | Mod: ORL

## 2021-08-12 LAB — SARS-COV-2 RNA RESP QL NAA+PROBE: NEGATIVE

## 2021-08-16 ENCOUNTER — LAB REQUISITION (OUTPATIENT)
Dept: LAB | Facility: CLINIC | Age: 81
End: 2021-08-16
Payer: COMMERCIAL

## 2021-08-18 PROCEDURE — U0003 INFECTIOUS AGENT DETECTION BY NUCLEIC ACID (DNA OR RNA); SEVERE ACUTE RESPIRATORY SYNDROME CORONAVIRUS 2 (SARS-COV-2) (CORONAVIRUS DISEASE [COVID-19]), AMPLIFIED PROBE TECHNIQUE, MAKING USE OF HIGH THROUGHPUT TECHNOLOGIES AS DESCRIBED BY CMS-2020-01-R: HCPCS | Mod: ORL

## 2021-08-19 LAB — SARS-COV-2 RNA RESP QL NAA+PROBE: NEGATIVE

## 2021-08-24 ENCOUNTER — LAB REQUISITION (OUTPATIENT)
Dept: LAB | Facility: CLINIC | Age: 81
End: 2021-08-24
Payer: COMMERCIAL

## 2021-08-25 PROCEDURE — U0003 INFECTIOUS AGENT DETECTION BY NUCLEIC ACID (DNA OR RNA); SEVERE ACUTE RESPIRATORY SYNDROME CORONAVIRUS 2 (SARS-COV-2) (CORONAVIRUS DISEASE [COVID-19]), AMPLIFIED PROBE TECHNIQUE, MAKING USE OF HIGH THROUGHPUT TECHNOLOGIES AS DESCRIBED BY CMS-2020-01-R: HCPCS | Mod: ORL

## 2021-08-26 LAB — SARS-COV-2 RNA RESP QL NAA+PROBE: NEGATIVE

## 2021-08-30 ENCOUNTER — LAB REQUISITION (OUTPATIENT)
Dept: LAB | Facility: CLINIC | Age: 81
End: 2021-08-30
Payer: COMMERCIAL

## 2021-09-01 PROCEDURE — U0005 INFEC AGEN DETEC AMPLI PROBE: HCPCS | Mod: ORL

## 2021-09-03 ENCOUNTER — LAB REQUISITION (OUTPATIENT)
Dept: LAB | Facility: CLINIC | Age: 81
End: 2021-09-03
Payer: COMMERCIAL

## 2021-09-03 LAB — SARS-COV-2 RNA RESP QL NAA+PROBE: NEGATIVE

## 2021-09-08 PROCEDURE — U0005 INFEC AGEN DETEC AMPLI PROBE: HCPCS | Mod: ORL

## 2021-09-09 LAB — SARS-COV-2 RNA RESP QL NAA+PROBE: NEGATIVE

## 2021-09-13 ENCOUNTER — LAB REQUISITION (OUTPATIENT)
Dept: LAB | Facility: CLINIC | Age: 81
End: 2021-09-13
Payer: COMMERCIAL

## 2021-09-14 ENCOUNTER — LAB REQUISITION (OUTPATIENT)
Dept: LAB | Facility: CLINIC | Age: 81
End: 2021-09-14
Payer: COMMERCIAL

## 2021-09-16 ENCOUNTER — LAB REQUISITION (OUTPATIENT)
Dept: LAB | Facility: CLINIC | Age: 81
End: 2021-09-16
Payer: COMMERCIAL

## 2021-09-16 PROCEDURE — U0005 INFEC AGEN DETEC AMPLI PROBE: HCPCS | Mod: ORL

## 2021-09-17 ENCOUNTER — LAB REQUISITION (OUTPATIENT)
Dept: LAB | Facility: CLINIC | Age: 81
End: 2021-09-17
Payer: COMMERCIAL

## 2021-09-17 LAB — SARS-COV-2 RNA RESP QL NAA+PROBE: NEGATIVE

## 2021-09-19 LAB — SARS-COV-2 RNA RESP QL NAA+PROBE: NEGATIVE

## 2021-09-19 PROCEDURE — U0005 INFEC AGEN DETEC AMPLI PROBE: HCPCS | Mod: ORL

## 2021-09-20 ENCOUNTER — LAB REQUISITION (OUTPATIENT)
Dept: LAB | Facility: CLINIC | Age: 81
End: 2021-09-20
Payer: COMMERCIAL

## 2021-09-22 PROCEDURE — U0005 INFEC AGEN DETEC AMPLI PROBE: HCPCS | Mod: ORL

## 2021-09-23 LAB — SARS-COV-2 RNA RESP QL NAA+PROBE: NEGATIVE

## 2021-09-26 ENCOUNTER — HEALTH MAINTENANCE LETTER (OUTPATIENT)
Age: 81
End: 2021-09-26

## 2021-12-28 ENCOUNTER — LAB REQUISITION (OUTPATIENT)
Dept: LAB | Facility: CLINIC | Age: 81
End: 2021-12-28
Payer: COMMERCIAL

## 2021-12-28 PROCEDURE — U0005 INFEC AGEN DETEC AMPLI PROBE: HCPCS | Mod: ORL | Performed by: INTERNAL MEDICINE

## 2021-12-29 LAB — SARS-COV-2 RNA RESP QL NAA+PROBE: NEGATIVE

## 2022-01-21 ENCOUNTER — LAB REQUISITION (OUTPATIENT)
Dept: LAB | Facility: CLINIC | Age: 82
End: 2022-01-21
Payer: COMMERCIAL

## 2022-01-21 DIAGNOSIS — U07.1 COVID-19: ICD-10-CM

## 2022-01-21 PROCEDURE — U0005 INFEC AGEN DETEC AMPLI PROBE: HCPCS | Mod: ORL | Performed by: INTERNAL MEDICINE

## 2022-01-22 LAB — SARS-COV-2 RNA RESP QL NAA+PROBE: NEGATIVE

## 2022-01-24 ENCOUNTER — LAB REQUISITION (OUTPATIENT)
Dept: LAB | Facility: CLINIC | Age: 82
End: 2022-01-24
Payer: COMMERCIAL

## 2022-01-24 DIAGNOSIS — U07.1 COVID-19: ICD-10-CM

## 2022-01-25 LAB — SARS-COV-2 RNA RESP QL NAA+PROBE: NEGATIVE

## 2022-01-25 PROCEDURE — U0003 INFECTIOUS AGENT DETECTION BY NUCLEIC ACID (DNA OR RNA); SEVERE ACUTE RESPIRATORY SYNDROME CORONAVIRUS 2 (SARS-COV-2) (CORONAVIRUS DISEASE [COVID-19]), AMPLIFIED PROBE TECHNIQUE, MAKING USE OF HIGH THROUGHPUT TECHNOLOGIES AS DESCRIBED BY CMS-2020-01-R: HCPCS | Mod: ORL | Performed by: INTERNAL MEDICINE

## 2022-03-15 ENCOUNTER — LAB REQUISITION (OUTPATIENT)
Dept: LAB | Facility: CLINIC | Age: 82
End: 2022-03-15
Payer: COMMERCIAL

## 2022-03-15 DIAGNOSIS — I25.10 ATHEROSCLEROTIC HEART DISEASE OF NATIVE CORONARY ARTERY WITHOUT ANGINA PECTORIS: ICD-10-CM

## 2022-03-16 LAB
ALBUMIN SERPL-MCNC: 4 G/DL (ref 3.5–5)
ALP SERPL-CCNC: 88 U/L (ref 45–120)
ALT SERPL W P-5'-P-CCNC: <9 U/L (ref 0–45)
ANION GAP SERPL CALCULATED.3IONS-SCNC: 14 MMOL/L (ref 5–18)
AST SERPL W P-5'-P-CCNC: 23 U/L (ref 0–40)
BASOPHILS # BLD AUTO: 0 10E3/UL (ref 0–0.2)
BASOPHILS NFR BLD AUTO: 0 %
BILIRUB SERPL-MCNC: 0.3 MG/DL (ref 0–1)
BUN SERPL-MCNC: 51 MG/DL (ref 8–28)
CALCIUM SERPL-MCNC: 9.7 MG/DL (ref 8.5–10.5)
CHLORIDE BLD-SCNC: 106 MMOL/L (ref 98–107)
CO2 SERPL-SCNC: 21 MMOL/L (ref 22–31)
CREAT SERPL-MCNC: 1.67 MG/DL (ref 0.7–1.3)
EOSINOPHIL # BLD AUTO: 0.3 10E3/UL (ref 0–0.7)
EOSINOPHIL NFR BLD AUTO: 5 %
ERYTHROCYTE [DISTWIDTH] IN BLOOD BY AUTOMATED COUNT: 13 % (ref 10–15)
GFR SERPL CREATININE-BSD FRML MDRD: 41 ML/MIN/1.73M2
GLUCOSE BLD-MCNC: 81 MG/DL (ref 70–125)
HCT VFR BLD AUTO: 40 % (ref 40–53)
HGB BLD-MCNC: 12.7 G/DL (ref 13.3–17.7)
IMM GRANULOCYTES # BLD: 0 10E3/UL
IMM GRANULOCYTES NFR BLD: 1 %
LYMPHOCYTES # BLD AUTO: 1.6 10E3/UL (ref 0.8–5.3)
LYMPHOCYTES NFR BLD AUTO: 32 %
MCH RBC QN AUTO: 29.3 PG (ref 26.5–33)
MCHC RBC AUTO-ENTMCNC: 31.8 G/DL (ref 31.5–36.5)
MCV RBC AUTO: 92 FL (ref 78–100)
MONOCYTES # BLD AUTO: 0.5 10E3/UL (ref 0–1.3)
MONOCYTES NFR BLD AUTO: 10 %
NEUTROPHILS # BLD AUTO: 2.7 10E3/UL (ref 1.6–8.3)
NEUTROPHILS NFR BLD AUTO: 52 %
NRBC # BLD AUTO: 0 10E3/UL
NRBC BLD AUTO-RTO: 0 /100
PLATELET # BLD AUTO: 143 10E3/UL (ref 150–450)
POTASSIUM BLD-SCNC: 4.7 MMOL/L (ref 3.5–5)
PROT SERPL-MCNC: 7.6 G/DL (ref 6–8)
RBC # BLD AUTO: 4.34 10E6/UL (ref 4.4–5.9)
SODIUM SERPL-SCNC: 141 MMOL/L (ref 136–145)
TSH SERPL DL<=0.005 MIU/L-ACNC: 5.81 UIU/ML (ref 0.3–5)
WBC # BLD AUTO: 5.1 10E3/UL (ref 4–11)

## 2022-03-16 PROCEDURE — 80053 COMPREHEN METABOLIC PANEL: CPT | Mod: ORL | Performed by: NURSE PRACTITIONER

## 2022-03-16 PROCEDURE — 36415 COLL VENOUS BLD VENIPUNCTURE: CPT | Mod: ORL | Performed by: NURSE PRACTITIONER

## 2022-03-16 PROCEDURE — P9604 ONE-WAY ALLOW PRORATED TRIP: HCPCS | Mod: ORL | Performed by: NURSE PRACTITIONER

## 2022-03-16 PROCEDURE — 85025 COMPLETE CBC W/AUTO DIFF WBC: CPT | Mod: ORL | Performed by: NURSE PRACTITIONER

## 2022-03-16 PROCEDURE — 84443 ASSAY THYROID STIM HORMONE: CPT | Mod: ORL | Performed by: NURSE PRACTITIONER

## 2022-03-22 ENCOUNTER — LAB REQUISITION (OUTPATIENT)
Dept: LAB | Facility: CLINIC | Age: 82
End: 2022-03-22
Payer: COMMERCIAL

## 2022-03-22 DIAGNOSIS — I95.89 OTHER HYPOTENSION: ICD-10-CM

## 2022-03-22 DIAGNOSIS — R94.6 ABNORMAL RESULTS OF THYROID FUNCTION STUDIES: ICD-10-CM

## 2022-03-23 LAB — T4 FREE SERPL-MCNC: 1.04 NG/DL (ref 0.7–1.8)

## 2022-03-23 PROCEDURE — 84439 ASSAY OF FREE THYROXINE: CPT | Mod: ORL | Performed by: FAMILY MEDICINE

## 2022-03-23 PROCEDURE — 36415 COLL VENOUS BLD VENIPUNCTURE: CPT | Mod: ORL | Performed by: FAMILY MEDICINE

## 2022-03-23 PROCEDURE — P9604 ONE-WAY ALLOW PRORATED TRIP: HCPCS | Mod: ORL | Performed by: FAMILY MEDICINE

## 2022-04-14 ENCOUNTER — LAB REQUISITION (OUTPATIENT)
Dept: LAB | Facility: CLINIC | Age: 82
End: 2022-04-14
Payer: COMMERCIAL

## 2022-04-14 DIAGNOSIS — U07.1 COVID-19: ICD-10-CM

## 2022-04-14 PROCEDURE — U0003 INFECTIOUS AGENT DETECTION BY NUCLEIC ACID (DNA OR RNA); SEVERE ACUTE RESPIRATORY SYNDROME CORONAVIRUS 2 (SARS-COV-2) (CORONAVIRUS DISEASE [COVID-19]), AMPLIFIED PROBE TECHNIQUE, MAKING USE OF HIGH THROUGHPUT TECHNOLOGIES AS DESCRIBED BY CMS-2020-01-R: HCPCS | Mod: ORL | Performed by: INTERNAL MEDICINE

## 2022-04-15 LAB — SARS-COV-2 RNA RESP QL NAA+PROBE: NEGATIVE

## 2022-04-17 ENCOUNTER — LAB REQUISITION (OUTPATIENT)
Dept: LAB | Facility: CLINIC | Age: 82
End: 2022-04-17
Payer: COMMERCIAL

## 2022-04-17 DIAGNOSIS — U07.1 COVID-19: ICD-10-CM

## 2022-04-18 PROCEDURE — U0005 INFEC AGEN DETEC AMPLI PROBE: HCPCS | Mod: ORL | Performed by: INTERNAL MEDICINE

## 2022-04-19 LAB — SARS-COV-2 RNA RESP QL NAA+PROBE: NEGATIVE

## 2022-05-08 ENCOUNTER — HEALTH MAINTENANCE LETTER (OUTPATIENT)
Age: 82
End: 2022-05-08

## 2022-07-12 ENCOUNTER — LAB REQUISITION (OUTPATIENT)
Dept: LAB | Facility: CLINIC | Age: 82
End: 2022-07-12
Payer: COMMERCIAL

## 2022-07-12 DIAGNOSIS — R53.83 OTHER FATIGUE: ICD-10-CM

## 2022-07-13 LAB
ERYTHROCYTE [DISTWIDTH] IN BLOOD BY AUTOMATED COUNT: 13.4 % (ref 10–15)
HCT VFR BLD AUTO: 33.9 % (ref 40–53)
HGB BLD-MCNC: 10.6 G/DL (ref 13.3–17.7)
MCH RBC QN AUTO: 29 PG (ref 26.5–33)
MCHC RBC AUTO-ENTMCNC: 31.3 G/DL (ref 31.5–36.5)
MCV RBC AUTO: 93 FL (ref 78–100)
PLATELET # BLD AUTO: 164 10E3/UL (ref 150–450)
RBC # BLD AUTO: 3.66 10E6/UL (ref 4.4–5.9)
T4 FREE SERPL-MCNC: 1.54 NG/DL (ref 0.9–1.7)
TSH SERPL DL<=0.005 MIU/L-ACNC: 1.81 UIU/ML (ref 0.3–4.2)
WBC # BLD AUTO: 4.7 10E3/UL (ref 4–11)

## 2022-07-13 PROCEDURE — P9604 ONE-WAY ALLOW PRORATED TRIP: HCPCS | Mod: ORL | Performed by: FAMILY MEDICINE

## 2022-07-13 PROCEDURE — 85027 COMPLETE CBC AUTOMATED: CPT | Mod: ORL | Performed by: FAMILY MEDICINE

## 2022-07-13 PROCEDURE — 84439 ASSAY OF FREE THYROXINE: CPT | Mod: ORL | Performed by: FAMILY MEDICINE

## 2022-07-13 PROCEDURE — 36415 COLL VENOUS BLD VENIPUNCTURE: CPT | Mod: ORL | Performed by: FAMILY MEDICINE

## 2022-07-13 PROCEDURE — 84443 ASSAY THYROID STIM HORMONE: CPT | Mod: ORL | Performed by: FAMILY MEDICINE

## 2022-08-24 ENCOUNTER — LAB REQUISITION (OUTPATIENT)
Dept: LAB | Facility: CLINIC | Age: 82
End: 2022-08-24
Payer: COMMERCIAL

## 2022-08-24 DIAGNOSIS — U07.1 COVID-19: ICD-10-CM

## 2022-08-24 PROCEDURE — U0005 INFEC AGEN DETEC AMPLI PROBE: HCPCS | Mod: ORL | Performed by: INTERNAL MEDICINE

## 2022-08-25 LAB — SARS-COV-2 RNA RESP QL NAA+PROBE: NEGATIVE

## 2022-08-31 ENCOUNTER — LAB REQUISITION (OUTPATIENT)
Dept: LAB | Facility: CLINIC | Age: 82
End: 2022-08-31
Payer: COMMERCIAL

## 2022-08-31 DIAGNOSIS — U07.1 COVID-19: ICD-10-CM

## 2022-08-31 PROCEDURE — U0003 INFECTIOUS AGENT DETECTION BY NUCLEIC ACID (DNA OR RNA); SEVERE ACUTE RESPIRATORY SYNDROME CORONAVIRUS 2 (SARS-COV-2) (CORONAVIRUS DISEASE [COVID-19]), AMPLIFIED PROBE TECHNIQUE, MAKING USE OF HIGH THROUGHPUT TECHNOLOGIES AS DESCRIBED BY CMS-2020-01-R: HCPCS | Mod: ORL | Performed by: FAMILY MEDICINE

## 2022-09-01 LAB — SARS-COV-2 RNA RESP QL NAA+PROBE: NEGATIVE

## 2022-09-20 ENCOUNTER — LAB REQUISITION (OUTPATIENT)
Dept: LAB | Facility: CLINIC | Age: 82
End: 2022-09-20
Payer: COMMERCIAL

## 2022-09-20 DIAGNOSIS — R50.9 FEVER, UNSPECIFIED: ICD-10-CM

## 2022-09-20 DIAGNOSIS — R30.0 DYSURIA: ICD-10-CM

## 2022-09-20 LAB
ALBUMIN UR-MCNC: 70 MG/DL
APPEARANCE UR: ABNORMAL
BILIRUB UR QL STRIP: NEGATIVE
COLOR UR AUTO: YELLOW
GLUCOSE UR STRIP-MCNC: NEGATIVE MG/DL
HGB UR QL STRIP: NEGATIVE
HYALINE CASTS: 3 /LPF
KETONES UR STRIP-MCNC: NEGATIVE MG/DL
LEUKOCYTE ESTERASE UR QL STRIP: ABNORMAL
NITRATE UR QL: NEGATIVE
PH UR STRIP: 5.5 [PH] (ref 5–7)
RBC URINE: 3 /HPF
SP GR UR STRIP: 1.02 (ref 1–1.03)
UROBILINOGEN UR STRIP-MCNC: NORMAL MG/DL
WBC URINE: 107 /HPF

## 2022-09-20 PROCEDURE — 87086 URINE CULTURE/COLONY COUNT: CPT | Mod: ORL | Performed by: NURSE PRACTITIONER

## 2022-09-20 PROCEDURE — 81001 URINALYSIS AUTO W/SCOPE: CPT | Mod: ORL | Performed by: NURSE PRACTITIONER

## 2022-09-23 LAB — BACTERIA UR CULT: ABNORMAL

## 2022-10-11 ENCOUNTER — LAB REQUISITION (OUTPATIENT)
Dept: LAB | Facility: CLINIC | Age: 82
End: 2022-10-11
Payer: COMMERCIAL

## 2022-10-11 DIAGNOSIS — I25.10 ATHEROSCLEROTIC HEART DISEASE OF NATIVE CORONARY ARTERY WITHOUT ANGINA PECTORIS: ICD-10-CM

## 2022-10-12 LAB
ANION GAP SERPL CALCULATED.3IONS-SCNC: 14 MMOL/L (ref 7–15)
BUN SERPL-MCNC: 55.7 MG/DL (ref 8–23)
CALCIUM SERPL-MCNC: 9.2 MG/DL (ref 8.8–10.2)
CHLORIDE SERPL-SCNC: 104 MMOL/L (ref 98–107)
CREAT SERPL-MCNC: 1.78 MG/DL (ref 0.67–1.17)
DEPRECATED HCO3 PLAS-SCNC: 21 MMOL/L (ref 22–29)
GFR SERPL CREATININE-BSD FRML MDRD: 38 ML/MIN/1.73M2
GLUCOSE SERPL-MCNC: 111 MG/DL (ref 70–99)
POTASSIUM SERPL-SCNC: 4.2 MMOL/L (ref 3.4–5.3)
SODIUM SERPL-SCNC: 139 MMOL/L (ref 136–145)

## 2022-10-12 PROCEDURE — 80048 BASIC METABOLIC PNL TOTAL CA: CPT | Mod: ORL | Performed by: NURSE PRACTITIONER

## 2022-10-12 PROCEDURE — 36415 COLL VENOUS BLD VENIPUNCTURE: CPT | Mod: ORL | Performed by: NURSE PRACTITIONER

## 2022-10-12 PROCEDURE — P9604 ONE-WAY ALLOW PRORATED TRIP: HCPCS | Mod: ORL | Performed by: NURSE PRACTITIONER

## 2022-11-02 ENCOUNTER — LAB REQUISITION (OUTPATIENT)
Dept: LAB | Facility: CLINIC | Age: 82
End: 2022-11-02
Payer: COMMERCIAL

## 2022-11-02 DIAGNOSIS — U07.1 COVID-19: ICD-10-CM

## 2022-11-02 PROCEDURE — U0005 INFEC AGEN DETEC AMPLI PROBE: HCPCS | Mod: ORL | Performed by: FAMILY MEDICINE

## 2022-11-03 LAB — SARS-COV-2 RNA RESP QL NAA+PROBE: NEGATIVE

## 2022-11-16 ENCOUNTER — LAB REQUISITION (OUTPATIENT)
Dept: LAB | Facility: CLINIC | Age: 82
End: 2022-11-16
Payer: COMMERCIAL

## 2022-11-16 DIAGNOSIS — U07.1 COVID-19: ICD-10-CM

## 2022-11-16 LAB — SARS-COV-2 RNA RESP QL NAA+PROBE: NEGATIVE

## 2022-11-16 PROCEDURE — U0005 INFEC AGEN DETEC AMPLI PROBE: HCPCS | Mod: ORL | Performed by: FAMILY MEDICINE

## 2022-11-21 ENCOUNTER — LAB REQUISITION (OUTPATIENT)
Dept: LAB | Facility: CLINIC | Age: 82
End: 2022-11-21
Payer: COMMERCIAL

## 2022-11-21 DIAGNOSIS — U07.1 COVID-19: ICD-10-CM

## 2022-11-21 PROCEDURE — U0003 INFECTIOUS AGENT DETECTION BY NUCLEIC ACID (DNA OR RNA); SEVERE ACUTE RESPIRATORY SYNDROME CORONAVIRUS 2 (SARS-COV-2) (CORONAVIRUS DISEASE [COVID-19]), AMPLIFIED PROBE TECHNIQUE, MAKING USE OF HIGH THROUGHPUT TECHNOLOGIES AS DESCRIBED BY CMS-2020-01-R: HCPCS | Mod: ORL | Performed by: FAMILY MEDICINE

## 2022-11-22 LAB — SARS-COV-2 RNA RESP QL NAA+PROBE: NEGATIVE

## 2022-11-27 ENCOUNTER — LAB REQUISITION (OUTPATIENT)
Dept: LAB | Facility: CLINIC | Age: 82
End: 2022-11-27
Payer: COMMERCIAL

## 2022-11-27 DIAGNOSIS — N40.1 BENIGN PROSTATIC HYPERPLASIA WITH LOWER URINARY TRACT SYMPTOMS: ICD-10-CM

## 2022-11-27 DIAGNOSIS — F06.0 PSYCHOTIC DISORDER WITH HALLUCINATIONS DUE TO KNOWN PHYSIOLOGICAL CONDITION: ICD-10-CM

## 2022-11-27 DIAGNOSIS — F05 DELIRIUM DUE TO KNOWN PHYSIOLOGICAL CONDITION: ICD-10-CM

## 2022-11-27 LAB
ALBUMIN UR-MCNC: 70 MG/DL
APPEARANCE UR: ABNORMAL
BILIRUB UR QL STRIP: NEGATIVE
COLOR UR AUTO: YELLOW
GLUCOSE UR STRIP-MCNC: NEGATIVE MG/DL
HGB UR QL STRIP: ABNORMAL
KETONES UR STRIP-MCNC: NEGATIVE MG/DL
LEUKOCYTE ESTERASE UR QL STRIP: ABNORMAL
NITRATE UR QL: NEGATIVE
PH UR STRIP: 6.5 [PH] (ref 5–7)
RBC URINE: 60 /HPF
SP GR UR STRIP: 1.01 (ref 1–1.03)
UROBILINOGEN UR STRIP-MCNC: NORMAL MG/DL
WBC CLUMPS #/AREA URNS HPF: PRESENT /HPF
WBC URINE: >182 /HPF

## 2022-11-27 PROCEDURE — 87088 URINE BACTERIA CULTURE: CPT | Mod: ORL | Performed by: NURSE PRACTITIONER

## 2022-11-27 PROCEDURE — 81001 URINALYSIS AUTO W/SCOPE: CPT | Mod: ORL | Performed by: NURSE PRACTITIONER

## 2022-11-28 LAB — BACTERIA UR CULT: ABNORMAL

## 2022-12-09 NOTE — ED NOTES
"[facility]  ED Nurse Handoff Report    ED Chief complaint: Syncope (pt was diagnosed with orthostatic HTN in March, pt was tranferring with PT today at  when went unresponsive. did not hit head, was lowered down)      ED Diagnosis:   Final diagnoses:   None       Code Status: Full Code    Allergies:   Allergies   Allergen Reactions     Ivp Dye [Contrast Dye]      Tape [Adhesive Tape]        Activity level - Baseline/Home:  Stand with Assist    Activity Level - Current:   Stand with Assist     Needed?: No    Isolation: No  Infection: Not Applicable  Bariatric?: No    Vital Signs:   Vitals:    01/24/19 1351 01/24/19 1424 01/24/19 1430 01/24/19 1500   BP: 124/69 (!) 138/94 (!) 138/94 (!) 164/98   Pulse: 78 83  82   Resp: 16  14 8   Temp: 97.5  F (36.4  C)      TempSrc: Oral      SpO2: 98% 98% 99% 100%       Cardiac Rhythm: ,   Cardiac  Cardiac Rhythm: Normal sinus rhythm    Pain level:      Is this patient confused?: No   Does this patient have a guardian?  No         If yes, is there guardianship documents in the Epic \"Code/ACP\" activity?  N/A         Guardian Notified?  N/A  [place] - Suicide Severity Rating Scale Completed?  Yes  If yes, what color did the patient score?  White    Patient Report: Initial Complaint: syncope  Focused Assessment: pt was at  today when pt was stood up with PT, pt had unresponsive episode with pt. Pt has Hx of orthostatic hypotension. Pt will be admitted for further symptom care. Pt's wife was called  Tests Performed: labs  Abnormal Results:   Labs Ordered and Resulted from Time of ED Arrival Up to the Time of Departure from the ED   BASIC METABOLIC PANEL - Abnormal; Notable for the following components:       Result Value    Chloride 110 (*)     Glucose 102 (*)     Urea Nitrogen 40 (*)     Creatinine 1.81 (*)     GFR Estimate 35 (*)     GFR Estimate If Black 40 (*)     Calcium 8.1 (*)     All other components within normal limits   CBC WITH " PLATELETS DIFFERENTIAL - Abnormal; Notable for the following components:    RBC Count 3.36 (*)     Hemoglobin 10.2 (*)     Hematocrit 31.2 (*)     Platelet Count 139 (*)     All other components within normal limits   TROPONIN I   ROUTINE UA WITH MICROSCOPIC   PERIPHERAL IV CATHETER   CARDIAC CONTINUOUS MONITORING       Treatments provided: fluids    Family Comments: none    OBS brochure/video discussed/provided to patient/family: N/A              Name of person given brochure if not patient: NA              Relationship to patient: NA    ED Medications:   Medications   0.9% sodium chloride BOLUS (1,000 mLs Intravenous New Bag 1/24/19 1405)     Followed by   sodium chloride 0.9% infusion (not administered)       Drips infusing?:  No    For the majority of the shift this patient was Green.   Interventions performed were none.    Severe Sepsis OR Septic Shock Diagnosis Present: No    To be done/followed up on inpatient unit:  see signed and held    ED NURSE PHONE NUMBER: *[phone]          Neuro

## 2023-01-01 ENCOUNTER — LAB REQUISITION (OUTPATIENT)
Dept: LAB | Facility: CLINIC | Age: 83
End: 2023-01-01
Payer: COMMERCIAL

## 2023-01-01 ENCOUNTER — HEALTH MAINTENANCE LETTER (OUTPATIENT)
Age: 83
End: 2023-01-01

## 2023-01-01 DIAGNOSIS — U07.1 COVID-19: ICD-10-CM

## 2023-01-01 DIAGNOSIS — N18.31 CHRONIC KIDNEY DISEASE, STAGE 3A (H): ICD-10-CM

## 2023-01-01 LAB
ACANTHOCYTES BLD QL SMEAR: NORMAL
ANION GAP SERPL CALCULATED.3IONS-SCNC: 12 MMOL/L (ref 7–15)
AUER BODIES BLD QL SMEAR: NORMAL
BASO STIPL BLD QL SMEAR: NORMAL
BASOPHILS # BLD AUTO: 0 10E3/UL (ref 0–0.2)
BASOPHILS NFR BLD AUTO: 0 %
BITE CELLS BLD QL SMEAR: NORMAL
BLISTER CELLS BLD QL SMEAR: NORMAL
BUN SERPL-MCNC: 42.7 MG/DL (ref 8–23)
BURR CELLS BLD QL SMEAR: NORMAL
CALCIUM SERPL-MCNC: 9.2 MG/DL (ref 8.8–10.2)
CHLORIDE SERPL-SCNC: 105 MMOL/L (ref 98–107)
CREAT SERPL-MCNC: 1.5 MG/DL (ref 0.67–1.17)
DACRYOCYTES BLD QL SMEAR: NORMAL
DEPRECATED HCO3 PLAS-SCNC: 26 MMOL/L (ref 22–29)
ELLIPTOCYTES BLD QL SMEAR: NORMAL
EOSINOPHIL # BLD AUTO: 0 10E3/UL (ref 0–0.7)
EOSINOPHIL NFR BLD AUTO: 0 %
ERYTHROCYTE [DISTWIDTH] IN BLOOD BY AUTOMATED COUNT: 14.2 % (ref 10–15)
FRAGMENTS BLD QL SMEAR: NORMAL
GFR SERPL CREATININE-BSD FRML MDRD: 46 ML/MIN/1.73M2
GLUCOSE SERPL-MCNC: 69 MG/DL (ref 70–99)
HCT VFR BLD AUTO: 38.8 % (ref 40–53)
HGB BLD-MCNC: 12 G/DL (ref 13.3–17.7)
HGB C CRYSTALS: NORMAL
HOWELL-JOLLY BOD BLD QL SMEAR: NORMAL
IMM GRANULOCYTES # BLD: 0 10E3/UL
IMM GRANULOCYTES NFR BLD: 1 %
LYMPHOCYTES # BLD AUTO: 1.1 10E3/UL (ref 0.8–5.3)
LYMPHOCYTES NFR BLD AUTO: 17 %
MCH RBC QN AUTO: 29.1 PG (ref 26.5–33)
MCHC RBC AUTO-ENTMCNC: 30.9 G/DL (ref 31.5–36.5)
MCV RBC AUTO: 94 FL (ref 78–100)
MONOCYTES # BLD AUTO: 0.5 10E3/UL (ref 0–1.3)
MONOCYTES NFR BLD AUTO: 8 %
NEUTROPHILS # BLD AUTO: 4.5 10E3/UL (ref 1.6–8.3)
NEUTROPHILS NFR BLD AUTO: 74 %
NEUTS HYPERSEG BLD QL SMEAR: NORMAL
NRBC # BLD AUTO: 0 10E3/UL
NRBC BLD AUTO-RTO: 0 /100
PLAT MORPH BLD: NORMAL
PLATELET # BLD AUTO: 177 10E3/UL (ref 150–450)
POLYCHROMASIA BLD QL SMEAR: NORMAL
POTASSIUM SERPL-SCNC: 4.7 MMOL/L (ref 3.4–5.3)
RBC # BLD AUTO: 4.12 10E6/UL (ref 4.4–5.9)
RBC AGGLUT BLD QL: NORMAL
RBC MORPH BLD: NORMAL
ROULEAUX BLD QL SMEAR: NORMAL
SARS-COV-2 RNA RESP QL NAA+PROBE: NEGATIVE
SARS-COV-2 RNA RESP QL NAA+PROBE: POSITIVE
SICKLE CELLS BLD QL SMEAR: NORMAL
SMUDGE CELLS BLD QL SMEAR: NORMAL
SODIUM SERPL-SCNC: 143 MMOL/L (ref 136–145)
SPHEROCYTES BLD QL SMEAR: NORMAL
STOMATOCYTES BLD QL SMEAR: NORMAL
TARGETS BLD QL SMEAR: NORMAL
TOXIC GRANULES BLD QL SMEAR: NORMAL
VARIANT LYMPHS BLD QL SMEAR: NORMAL
WBC # BLD AUTO: 6.1 10E3/UL (ref 4–11)

## 2023-01-01 PROCEDURE — U0005 INFEC AGEN DETEC AMPLI PROBE: HCPCS | Mod: ORL | Performed by: REGISTERED NURSE

## 2023-01-01 PROCEDURE — U0003 INFECTIOUS AGENT DETECTION BY NUCLEIC ACID (DNA OR RNA); SEVERE ACUTE RESPIRATORY SYNDROME CORONAVIRUS 2 (SARS-COV-2) (CORONAVIRUS DISEASE [COVID-19]), AMPLIFIED PROBE TECHNIQUE, MAKING USE OF HIGH THROUGHPUT TECHNOLOGIES AS DESCRIBED BY CMS-2020-01-R: HCPCS | Mod: ORL | Performed by: INTERNAL MEDICINE

## 2023-01-01 PROCEDURE — U0003 INFECTIOUS AGENT DETECTION BY NUCLEIC ACID (DNA OR RNA); SEVERE ACUTE RESPIRATORY SYNDROME CORONAVIRUS 2 (SARS-COV-2) (CORONAVIRUS DISEASE [COVID-19]), AMPLIFIED PROBE TECHNIQUE, MAKING USE OF HIGH THROUGHPUT TECHNOLOGIES AS DESCRIBED BY CMS-2020-01-R: HCPCS | Mod: ORL | Performed by: REGISTERED NURSE

## 2023-01-01 PROCEDURE — U0005 INFEC AGEN DETEC AMPLI PROBE: HCPCS | Mod: ORL | Performed by: NURSE PRACTITIONER

## 2023-01-01 PROCEDURE — 36415 COLL VENOUS BLD VENIPUNCTURE: CPT | Mod: ORL | Performed by: FAMILY MEDICINE

## 2023-01-01 PROCEDURE — 87635 SARS-COV-2 COVID-19 AMP PRB: CPT | Mod: ORL | Performed by: REGISTERED NURSE

## 2023-01-01 PROCEDURE — 80048 BASIC METABOLIC PNL TOTAL CA: CPT | Mod: ORL | Performed by: FAMILY MEDICINE

## 2023-01-01 PROCEDURE — P9604 ONE-WAY ALLOW PRORATED TRIP: HCPCS | Mod: ORL | Performed by: FAMILY MEDICINE

## 2023-01-01 PROCEDURE — 85025 COMPLETE CBC W/AUTO DIFF WBC: CPT | Mod: ORL | Performed by: REGISTERED NURSE
